# Patient Record
Sex: MALE | Race: WHITE | NOT HISPANIC OR LATINO | Employment: OTHER | URBAN - METROPOLITAN AREA
[De-identification: names, ages, dates, MRNs, and addresses within clinical notes are randomized per-mention and may not be internally consistent; named-entity substitution may affect disease eponyms.]

---

## 2022-01-04 ENCOUNTER — OFFICE VISIT (OUTPATIENT)
Dept: ORTHOPEDIC SURGERY | Facility: CLINIC | Age: 47
End: 2022-01-04

## 2022-01-04 VITALS
DIASTOLIC BLOOD PRESSURE: 72 MMHG | BODY MASS INDEX: 33.64 KG/M2 | HEART RATE: 98 BPM | SYSTOLIC BLOOD PRESSURE: 112 MMHG | HEIGHT: 70 IN | WEIGHT: 235 LBS

## 2022-01-04 DIAGNOSIS — M25.562 LEFT KNEE PAIN, UNSPECIFIED CHRONICITY: Primary | ICD-10-CM

## 2022-01-04 DIAGNOSIS — M17.12 OSTEOARTHRITIS OF LEFT PATELLOFEMORAL JOINT: ICD-10-CM

## 2022-01-04 PROCEDURE — 99204 OFFICE O/P NEW MOD 45 MIN: CPT | Performed by: NURSE PRACTITIONER

## 2022-01-04 PROCEDURE — 73562 X-RAY EXAM OF KNEE 3: CPT | Performed by: NURSE PRACTITIONER

## 2022-01-04 RX ORDER — MELOXICAM 15 MG/1
15 TABLET ORAL DAILY
Qty: 90 TABLET | Refills: 1 | Status: SHIPPED | OUTPATIENT
Start: 2022-01-04 | End: 2022-06-23

## 2022-01-04 RX ORDER — DICLOFENAC SODIUM 75 MG/1
75 TABLET, DELAYED RELEASE ORAL 2 TIMES DAILY WITH MEALS
COMMUNITY
Start: 2021-12-06 | End: 2022-01-04

## 2022-01-04 RX ORDER — LISINOPRIL AND HYDROCHLOROTHIAZIDE 25; 20 MG/1; MG/1
TABLET ORAL
COMMUNITY
Start: 2021-10-05

## 2022-01-04 RX ORDER — LIRAGLUTIDE 6 MG/ML
INJECTION, SOLUTION SUBCUTANEOUS
COMMUNITY
Start: 2021-12-20

## 2022-01-04 RX ORDER — PREDNISONE 1 MG/1
TABLET ORAL
Qty: 21 TABLET | Refills: 0 | Status: SHIPPED | OUTPATIENT
Start: 2022-01-04 | End: 2022-02-08

## 2022-01-04 NOTE — PROGRESS NOTES
Subjective:     Patient ID: Jayant Blake is a 46 y.o. male.    Chief Complaint:  Left knee pain, new patient to examiner   History of Present Illness  Jayant Blake 46-year-old male presents to clinic for evaluation of his left knee.  Began experiencing pain approximately 3 weeks ago when he was seen at chiropractic office for unrelated issue.  He believes he somehow had the left lower extremity twisted on 12/17/2020 when a few days later began experiencing pain.  He was unable to completely extend and flex the knee without experiencing significant pain across the anterior aspect of the knee.  He presented to outside facility was initially evaluated received IM injection which was not helpful.  He did apply ice and topical Biofreeze which did help symptom relief.  Presents today just for evaluation.  Denies any prior x-ray, MRI, CT.  Denies any prior injury to the knees in the past.  He did play any sports when he was younger including baseball catcher, basketball, golf but denies known specific injury to the knee.  He is currently taking diclofenac which was prescribed for his back which has not helped with symptom relief.  Denies of the knee is locking, catching or giving way.  Denies any prior corticosteroid injections, viscosupplementation injections.  Denies other concerns present this time.     Social History     Occupational History   • Not on file   Tobacco Use   • Smoking status: Never Smoker   • Smokeless tobacco: Current User     Types: Snuff   Vaping Use   • Vaping Use: Never used   Substance and Sexual Activity   • Alcohol use: Never   • Drug use: Never   • Sexual activity: Defer      Past Medical History:   Diagnosis Date   • Anemia    • Fracture of wrist     fx forearm    • Hypertension      Past Surgical History:   Procedure Laterality Date   • FOREARM FRACTURE SURGERY         Family History   Problem Relation Age of Onset   • Hypertension Mother          Objective:  Physical Exam    Vital signs  "reviewed.   General: No acute distress.  Eyes: conjunctiva clear; pupils equally round and reactive  ENT: external ears and nose atraumatic; oropharynx clear  CV: no peripheral edema  Resp: normal respiratory effort  Skin: no rashes or wounds; normal turgor  Psych: mood and affect appropriate; recent and remote memory intact    Vitals:    01/04/22 1256   BP: 112/72   Pulse: 98   Weight: 107 kg (235 lb)   Height: 177.8 cm (70\")         01/04/22  1256   Weight: 107 kg (235 lb)     Body mass index is 33.72 kg/m².      Left Knee Exam     Tenderness   The patient is experiencing tenderness in the patella.    Range of Motion   Extension: 0   Left knee flexion: 125.     Tests   Jewel:  Medial - negative Lateral - negative  Varus: negative Valgus: negative  Lachman:  Anterior - 1+    Posterior - negative  Patellar apprehension: positive    Other   Erythema: absent  Sensation: normal  Pulse: present  Swelling: mild  Effusion: no effusion present    Comments:  Mildly positive active patellar compression test  Positive crepitus throughout arc of motion  Negative straight leg raise  Negative logroll exam, negative Stinchfield exam                 Imaging:  Right Knee X-Ray  Indication: Pain    AP, Lateral, and [place] views    Findings:  No fracture  No bony lesion  Normal soft tissues  Chondromalacia patellofemoral joint, mild medial compartment narrowing    No prior studies were available for comparison.    Assessment:        1. Left knee pain, unspecified chronicity    2. Osteoarthritis of left patellofemoral joint           Plan:  1.  Discussed plan of care with patient.  Discussed treatment options including corticosteroid injection, oral anti-inflammatory and oral prednisone.  At this time given his symptom improvement I do not believe he needs to proceed with corticosteroid injection.  Will start prednisone taper followed by meloxicam 1 tablet once daily.  Provided him with home strengthening exercises including the " quads, hamstrings, calf muscles.  Encouraged to avoid activities involving deep squats.  Plan to see him back in clinic if not improving.  All questions answered.  2.     Orders:  Orders Placed This Encounter   Procedures   • XR Knee 3+ View With [place] Left       Medications:  New Medications Ordered This Visit   Medications   • predniSONE (DELTASONE) 5 MG tablet     Sig: Take as directed     Dispense:  21 tablet     Refill:  0   • meloxicam (MOBIC) 15 MG tablet     Sig: Take 1 tablet by mouth Daily.     Dispense:  90 tablet     Refill:  1       Followup:  No follow-ups on file.    Diagnoses and all orders for this visit:    1. Left knee pain, unspecified chronicity (Primary)  -     XR Knee 3+ View With [place] Left    2. Osteoarthritis of left patellofemoral joint    Other orders  -     predniSONE (DELTASONE) 5 MG tablet; Take as directed  Dispense: 21 tablet; Refill: 0  -     meloxicam (MOBIC) 15 MG tablet; Take 1 tablet by mouth Daily.  Dispense: 90 tablet; Refill: 1          I ordered and reviewed the [facility] today.     Dictated utilizing Dragon dictation

## 2022-01-11 ENCOUNTER — OFFICE VISIT (OUTPATIENT)
Dept: ORTHOPEDIC SURGERY | Facility: CLINIC | Age: 47
End: 2022-01-11

## 2022-01-11 VITALS — RESPIRATION RATE: 16 BRPM | BODY MASS INDEX: 33.64 KG/M2 | WEIGHT: 235 LBS | HEIGHT: 70 IN

## 2022-01-11 DIAGNOSIS — M25.562 MECHANICAL KNEE PAIN, LEFT: ICD-10-CM

## 2022-01-11 DIAGNOSIS — M17.12 OSTEOARTHRITIS OF LEFT PATELLOFEMORAL JOINT: Primary | ICD-10-CM

## 2022-01-11 PROCEDURE — 20610 DRAIN/INJ JOINT/BURSA W/O US: CPT | Performed by: NURSE PRACTITIONER

## 2022-01-11 PROCEDURE — 99213 OFFICE O/P EST LOW 20 MIN: CPT | Performed by: NURSE PRACTITIONER

## 2022-01-11 RX ORDER — TRIAMCINOLONE ACETONIDE 40 MG/ML
80 INJECTION, SUSPENSION INTRA-ARTICULAR; INTRAMUSCULAR
Status: COMPLETED | OUTPATIENT
Start: 2022-01-11 | End: 2022-01-11

## 2022-01-11 RX ORDER — LIDOCAINE HYDROCHLORIDE 10 MG/ML
8 INJECTION, SOLUTION EPIDURAL; INFILTRATION; INTRACAUDAL; PERINEURAL
Status: COMPLETED | OUTPATIENT
Start: 2022-01-11 | End: 2022-01-11

## 2022-01-11 RX ADMIN — TRIAMCINOLONE ACETONIDE 80 MG: 40 INJECTION, SUSPENSION INTRA-ARTICULAR; INTRAMUSCULAR at 11:02

## 2022-01-11 RX ADMIN — LIDOCAINE HYDROCHLORIDE 8 ML: 10 INJECTION, SOLUTION EPIDURAL; INFILTRATION; INTRACAUDAL; PERINEURAL at 11:02

## 2022-01-11 NOTE — PROGRESS NOTES
Subjective:     Patient ID: Jayant Blake is a 46 y.o. male.    Chief Complaint:  Follow-up osteoarthritis patellofemoral joint left knee  Mechanical knee pain, left worsening symptoms last 2 days/new onset of symptoms  History of Present Illness  Jayant Blake Returns to clinic for follow-up of his left knee.  He was seen last week started on meloxicam which he was taking did note symptom improvement until this weekend.  He was at home on Saturday, reports a water leak at his home he was simply watching someone fix the water leak went to bed on Saturday night without pain woke up Sunday morning with pain and swelling and inability to comfortably flex and extend the knee.  Denies any injury has continued taking meloxicam without any significant symptom relief.  He does have upcoming trip and wanted to be seen today to evaluate the knee.  He is getting experiencing a catching pain along the anterior aspect of the knee and localizes pain over the patella and the distal aspect of the femur.  Denies any prior MRI or CT x-rays were completed at last visit 1 week ago.  He does report feeling as if the knee is going to give out on him.  Denies pain rating to the groin or to the lateral aspect of the hip.  Denies any numbness or tingling to the left lower extremity.  Denies other concerns present time.    Left knee exam:           Social History     Occupational History   • Not on file   Tobacco Use   • Smoking status: Never Smoker   • Smokeless tobacco: Current User     Types: Snuff   Vaping Use   • Vaping Use: Never used   Substance and Sexual Activity   • Alcohol use: Never   • Drug use: Never   • Sexual activity: Defer      Past Medical History:   Diagnosis Date   • Anemia    • Fracture of wrist     fx forearm    • Hypertension      Past Surgical History:   Procedure Laterality Date   • FOREARM FRACTURE SURGERY         Family History   Problem Relation Age of Onset   • Hypertension Mother   "        Objective:  Physical Exam    General: No acute distress.  Eyes: conjunctiva clear; pupils equally round and reactive  ENT: external ears and nose atraumatic; oropharynx clear  CV: no peripheral edema  Resp: normal respiratory effort  Skin: no rashes or wounds; normal turgor  Psych: mood and affect appropriate; recent and remote memory intact    Vitals:    01/11/22 1023   Resp: 16   Weight: 107 kg (235 lb)   Height: 177.8 cm (70\")         01/11/22  1023   Weight: 107 kg (235 lb)     Body mass index is 33.72 kg/m².      Left Knee Exam     Tenderness   The patient is experiencing tenderness in the patella, medial retinaculum and lateral retinaculum.    Range of Motion   Extension: 0   Left knee flexion: 125.     Tests   Jewel:  Medial - negative Lateral - negative  Varus: negative Valgus: negative  Lachman:  Anterior - 1+    Posterior - negative  Drawer:  Anterior - negative     Posterior - negative  Patellar apprehension: positive    Other   Erythema: absent  Sensation: normal  Pulse: present  Swelling: moderate  Effusion: no effusion present    Comments:  Positive active patellar compression test  Positive crepitus throughout arc of motion  Negative straight leg raise  Negative logroll exam, negative Stinchfield exam                 Imaging:    Assessment:        1. Osteoarthritis of left patellofemoral joint    2. Mechanical knee pain, left           Plan:    1.  Discussed plan of care with patient.  Discussed treatment options including oral steroids versus the corticosteroid injection versus MRI.    2. Given that he is leaving in 1 day and will be ambulating long distances on concrete he wishes to proceed with corticosteroid injection today's visit.  Discussed with patient that steroid can take 3 to 7 days before it starts to work you may really see relief today with lidocaine but will take a few days before the steroid starts to take effect.  3. I do recommend application of ice at injection site.  "   4. Continue meloxicam once daily.    5. Continue with strengthening exercises previously provided.    6. Plan to see him back in clinic in 4 weeks to reevaluate.  If he continues to experience mechanical knee symptoms we will proceed with MRI.  He verbalized worsening of all information agrees with plan of care.  Denies other concerns present time.      Large Joint Arthrocentesis: L knee  Date/Time: 1/11/2022 11:02 AM  Consent given by: patient  Site marked: site marked  Timeout: Immediately prior to procedure a time out was called to verify the correct patient, procedure, equipment, support staff and site/side marked as required   Supporting Documentation  Indications: pain   Procedure Details  Location: knee - L knee  Preparation: Patient was prepped and draped in the usual sterile fashion  Needle size: 22 G  Approach: superior (lateral)  Medications administered: 80 mg triamcinolone acetonide 40 MG/ML; 8 mL lidocaine PF 1% 1 %  Patient tolerance: patient tolerated the procedure well with no immediate complications          Orders:  Orders Placed This Encounter   Procedures   • Large Joint Arthrocentesis: L knee       Medications:  No orders of the defined types were placed in this encounter.      Followup:  No follow-ups on file.    Diagnoses and all orders for this visit:    1. Osteoarthritis of left patellofemoral joint (Primary)    2. Mechanical knee pain, left    Other orders  -     Large Joint Arthrocentesis: L knee        Dictated utilizing Dragon dictation

## 2022-02-08 ENCOUNTER — OFFICE VISIT (OUTPATIENT)
Dept: ORTHOPEDIC SURGERY | Facility: CLINIC | Age: 47
End: 2022-02-08

## 2022-02-08 VITALS — BODY MASS INDEX: 33.64 KG/M2 | HEIGHT: 70 IN | WEIGHT: 235 LBS

## 2022-02-08 DIAGNOSIS — M25.562 MECHANICAL KNEE PAIN, LEFT: Primary | ICD-10-CM

## 2022-02-08 DIAGNOSIS — M54.31 SCIATIC NERVE PAIN, RIGHT: ICD-10-CM

## 2022-02-08 PROCEDURE — 99214 OFFICE O/P EST MOD 30 MIN: CPT | Performed by: NURSE PRACTITIONER

## 2022-02-08 RX ORDER — PREDNISONE 1 MG/1
TABLET ORAL
Qty: 21 TABLET | Refills: 0 | Status: SHIPPED | OUTPATIENT
Start: 2022-02-08

## 2022-02-08 RX ORDER — CYCLOBENZAPRINE HCL 10 MG
10 TABLET ORAL NIGHTLY PRN
Qty: 30 TABLET | Refills: 0 | Status: SHIPPED | OUTPATIENT
Start: 2022-02-08

## 2022-02-08 NOTE — PROGRESS NOTES
Subjective:     Patient ID: Jayant Blake is a 46 y.o. male.    Chief Complaint:  Follow-up osteoarthritis patellofemoral joint left knee  History of Present Illness  Jayant Blake Presents to clinic for evaluation of his left knee.  Received corticosteroid injection last visit was able [place] without experiencing severe pain.  Swelling has continued to be under control denies that the knee is locking, catching or giving way.  Does notice slight twinge along the medial joint line along the medial aspect of the patella as well but otherwise tolerating well.  Denies of the knee is locking, catching or giving way.  He is very pleased with the symptom relief with the corticosteroid injection he received.  Also reporting increased pain along the low back radiating down bilateral lower extremity.  Believes that this is related to increased activity including ambulating long distances.  Denies all other concerns present this time.       Social History     Occupational History   • Not on file   Tobacco Use   • Smoking status: Never Smoker   • Smokeless tobacco: Current User     Types: Snuff   Vaping Use   • Vaping Use: Never used   Substance and Sexual Activity   • Alcohol use: Never   • Drug use: Never   • Sexual activity: Defer      Past Medical History:   Diagnosis Date   • Anemia    • Fracture of wrist     fx forearm    • Hypertension      Past Surgical History:   Procedure Laterality Date   • FOREARM FRACTURE SURGERY         Family History   Problem Relation Age of Onset   • Hypertension Mother              Objective:  Physical Exam    General: No acute distress.  Eyes: conjunctiva clear; pupils equally round and reactive  ENT: external ears and nose atraumatic; oropharynx clear  CV: no peripheral edema  Resp: normal respiratory effort  Skin: no rashes or wounds; normal turgor  Psych: mood and affect appropriate; recent and remote memory intact    Vitals:    02/08/22  "0942   Weight: 107 kg (235 lb)   Height: 177.8 cm (70\")         22  0942   Weight: 107 kg (235 lb)     Body mass index is 33.72 kg/m².      Ortho Exam      Left Knee Exam      Tenderness   Positive tenderness with deep palpation along the medial joint line  Range of Motion   Extension: 0   Left knee flexion: 125.      Tests   Jewel:  Medial - negative Lateral - negative  Varus: negative Valgus: negative  Lachman:  Anterior - 1+    Posterior - negative  Drawer:  Anterior - negative     Posterior - negative  Patellar apprehension: positive     Other   Erythema: absent  Sensation: normal  Pulse: present  Swelling: moderate  Effusion: no effusion present     Comments:    Positive active patellar compression test  Positive crepitus throughout arc of motion  Negative straight leg raise    Assessment:        1. Mechanical knee pain, left    2. Sciatic nerve pain, right           Plan:  1.  Discussed plan of care with patient.  We will plan to see him back in clinic as far as his knee when needed.  Discussed as far as the low back pain will start some cyclobenzaprine as well as prednisone taper dose if not improving gladly see him back in clinic.  Discussed with patient we will treat the back care but can refer if necessary.  He verbalized understanding wants me sugars with plan of care.  He was also provided with lumbar, core strengthening exercises and stretches.  All questions answered.  Orders:  No orders of the defined types were placed in this encounter.      Medications:  New Medications Ordered This Visit   Medications   • predniSONE (DELTASONE) 5 MG tablet     Si tabs day 1, 5 tabs day 2, 4 tabs day 3, 3 tabs day 4, 2 tabs day 5, 1 tab day 6     Dispense:  21 tablet     Refill:  0   • cyclobenzaprine (FLEXERIL) 10 MG tablet     Sig: Take 1 tablet by mouth At Night As Needed for Muscle Spasms.     Dispense:  30 tablet     Refill:  0       Followup:  No follow-ups on file.    Diagnoses and all orders for " this visit:    1. Mechanical knee pain, left (Primary)    2. Sciatic nerve pain, right    Other orders  -     predniSONE (DELTASONE) 5 MG tablet; 6 tabs day 1, 5 tabs day 2, 4 tabs day 3, 3 tabs day 4, 2 tabs day 5, 1 tab day 6  Dispense: 21 tablet; Refill: 0  -     cyclobenzaprine (FLEXERIL) 10 MG tablet; Take 1 tablet by mouth At Night As Needed for Muscle Spasms.  Dispense: 30 tablet; Refill: 0          Dictated utilizing Dragon dictation

## 2022-06-23 RX ORDER — MELOXICAM 15 MG/1
TABLET ORAL
Qty: 90 TABLET | Refills: 0 | Status: SHIPPED | OUTPATIENT
Start: 2022-06-23 | End: 2022-09-15

## 2022-06-23 NOTE — TELEPHONE ENCOUNTER
Rx Refill Note  Requested Prescriptions     Pending Prescriptions Disp Refills    meloxicam (MOBIC) 15 MG tablet [Pharmacy Med Name: Meloxicam 15 MG Oral Tablet] 90 tablet 0     Sig: Take 1 tablet by mouth once daily      Last office visit with prescribing clinician: 2/8/2022      Next office visit with prescribing clinician: Visit date not found   Last Filled:01.04.2022  DX:  1. Mechanical knee pain, left    2. Sciatic nerve pain, right         Dina Lorenzana MA  06/23/22, 09:27 EDT    {TIP  Encounters:    {TIP  Please add Last Relevant Lab Date if appropriate:  {TIP  Is Refill Pharmacy correct?:

## 2022-09-15 RX ORDER — MELOXICAM 15 MG/1
TABLET ORAL
Qty: 34 TABLET | Refills: 0 | Status: SHIPPED | OUTPATIENT
Start: 2022-09-15 | End: 2022-10-03

## 2022-09-15 NOTE — TELEPHONE ENCOUNTER
Rx Refill Note  Requested Prescriptions     Pending Prescriptions Disp Refills    meloxicam (MOBIC) 15 MG tablet [Pharmacy Med Name: Meloxicam 15 MG Oral Tablet] 34 tablet 0     Sig: Take 1 tablet by mouth once daily      Last office visit with prescribing clinician: Visit date not found      Next office visit with prescribing clinician: Visit date not found   Last Filled:06.23.2022    DX:      1. Mechanical knee pain, left    2. Sciatic nerve pain, right        Dina Lorenzana MA  09/15/22, 10:07 EDT    {TIP  Encounters:    {TIP  Please add Last Relevant Lab Date if appropriate:  {TIP  Is Refill Pharmacy correct?:

## 2022-10-03 RX ORDER — MELOXICAM 15 MG/1
TABLET ORAL
Qty: 90 TABLET | Refills: 0 | Status: SHIPPED | OUTPATIENT
Start: 2022-10-03 | End: 2022-12-07

## 2022-10-03 NOTE — TELEPHONE ENCOUNTER
Rx Refill Note  Requested Prescriptions     Pending Prescriptions Disp Refills    meloxicam (MOBIC) 15 MG tablet [Pharmacy Med Name: Meloxicam 15 MG Oral Tablet] 90 tablet 0     Sig: Take 1 tablet by mouth once daily      Last office visit with prescribing clinician: Visit date not found      Next office visit with prescribing clinician: Visit date not found   Last Filled:09.15.2022    Dx:  1. Mechanical knee pain, left (Primary)     2. Sciatic nerve pain, right  Dina Lorenzana MA  10/03/22, 10:53 EDT    {TIP  Encounters:    {TIP  Please add Last Relevant Lab Date if appropriate:  {TIP  Is Refill Pharmacy correct?:

## 2022-12-07 RX ORDER — MELOXICAM 15 MG/1
TABLET ORAL
Qty: 30 TABLET | Refills: 0 | Status: SHIPPED | OUTPATIENT
Start: 2022-12-07

## 2022-12-07 NOTE — TELEPHONE ENCOUNTER
Rx Refill Note  Requested Prescriptions     Pending Prescriptions Disp Refills    meloxicam (MOBIC) 15 MG tablet [Pharmacy Med Name: Meloxicam 15 MG Oral Tablet] 30 tablet 0     Sig: Take 1 tablet by mouth once daily      Last office visit with prescribing clinician: 2/8/2022      Next office visit with prescribing clinician: Visit date not found   Last Filled:10.03.2022    DX:  1. Mechanical knee pain, left    2. Sciatic nerve pain, right           Dina Lorenzana MA  12/07/22, 10:27 EST    {TIP  Encounters:    {TIP  Please add Last Relevant Lab Date if appropriate:  {TIP  Is Refill Pharmacy correct?:

## 2023-05-12 NOTE — PROGRESS NOTES
Presents to clinic for evaluation of his left knee. Received corticosteroid injection last visit was able [place] ambulate [place] without experiencing severe pain. Swelling has continued to be under control denies that the knee is locking, catching or giving way. Does notice slight twinge along the medial joint line along the medial aspect of the patella as well but otherwise tolerating well. Denies of the knee is locking, catching or giving way. He is very pleased with the symptom relief with the corticosteroid injection he received. Denies all other concerns present this time.    Exam:  Left knee examined  Knee range of motion 0 degrees extension to 125 degrees flexion  Stable to varus valgus stress at 0 degrees and 30 degrees  Positive tenderness with deep palpation along the medial joint line  Positive crepitus throughout arc of motion  No evidence of any swelling no joint effusion  Quad strength 5 out of 5 no